# Patient Record
Sex: FEMALE | Race: WHITE | Employment: FULL TIME | URBAN - METROPOLITAN AREA
[De-identification: names, ages, dates, MRNs, and addresses within clinical notes are randomized per-mention and may not be internally consistent; named-entity substitution may affect disease eponyms.]

---

## 2017-03-02 ENCOUNTER — HOSPITAL ENCOUNTER (OUTPATIENT)
Dept: CARDIOLOGY | Facility: CLINIC | Age: 52
Discharge: HOME | End: 2017-03-02
Attending: FAMILY MEDICINE | Admitting: FAMILY MEDICINE
Payer: COMMERCIAL

## 2017-03-02 DIAGNOSIS — Z82.49 FAMILY HISTORY OF EARLY CAD: ICD-10-CM

## 2017-03-02 PROCEDURE — 75571 CT HRT W/O DYE W/CA TEST: CPT | Mod: 26 | Performed by: INTERNAL MEDICINE

## 2017-03-02 PROCEDURE — 75571 CT HRT W/O DYE W/CA TEST: CPT

## 2017-03-03 NOTE — PROGRESS NOTES
Calcium score results sent to patient and routed to PCP list in Epic, Kenneth Pallas. Radha Sotelo, CV Imaging Manager          .

## 2017-10-12 ENCOUNTER — HOSPITAL ENCOUNTER (OUTPATIENT)
Dept: MAMMOGRAPHY | Facility: CLINIC | Age: 52
Discharge: HOME | End: 2017-10-12
Attending: OBSTETRICS & GYNECOLOGY | Admitting: OBSTETRICS & GYNECOLOGY
Payer: COMMERCIAL

## 2017-10-12 DIAGNOSIS — Z12.31 VISIT FOR SCREENING MAMMOGRAM: ICD-10-CM

## 2017-10-12 PROCEDURE — G0202 SCR MAMMO BI INCL CAD: HCPCS

## 2017-10-12 PROCEDURE — 77063 BREAST TOMOSYNTHESIS BI: CPT

## 2018-07-12 ENCOUNTER — TRANSFERRED RECORDS (OUTPATIENT)
Dept: HEALTH INFORMATION MANAGEMENT | Facility: CLINIC | Age: 53
End: 2018-07-12

## 2018-08-09 ENCOUNTER — OFFICE VISIT (OUTPATIENT)
Dept: CARDIOLOGY | Facility: CLINIC | Age: 53
End: 2018-08-09
Payer: COMMERCIAL

## 2018-08-09 VITALS
SYSTOLIC BLOOD PRESSURE: 138 MMHG | HEIGHT: 62 IN | WEIGHT: 201 LBS | HEART RATE: 76 BPM | BODY MASS INDEX: 36.99 KG/M2 | DIASTOLIC BLOOD PRESSURE: 82 MMHG

## 2018-08-09 DIAGNOSIS — R00.0 TACHYCARDIA: Primary | ICD-10-CM

## 2018-08-09 DIAGNOSIS — R00.2 PALPITATIONS: ICD-10-CM

## 2018-08-09 PROCEDURE — 99204 OFFICE O/P NEW MOD 45 MIN: CPT | Performed by: INTERNAL MEDICINE

## 2018-08-09 NOTE — LETTER
8/9/2018    Kenneth G. Pallas, MD  [facility] [address]  [facility] [address]    RE: Yenni Ramires       Dear Colleague,    I had the pleasure of seeing Yenni Ramires in the [facility].    HPI and Plan:   See dictation    Orders Placed This Encounter   Procedures     Basic metabolic panel     TSH     Magnesium     Follow-Up with Cardiologist     Cardiac Event Monitor - Peds/Adult     Exercise Stress Echocardiogram     Echocardiogram       No orders of the defined types were placed in this encounter.      There are no discontinued medications.      Encounter Diagnoses   Name Primary?     Tachycardia Yes     Palpitations        CURRENT MEDICATIONS:  Current Outpatient Prescriptions   Medication Sig Dispense Refill     ALBUTEROL IN Inhale  into the lungs. prn       Cholecalciferol (VITAMIN D PO) Take  by mouth. occ and occ vitamin C       Multiple Vitamin (DAILY MULTIVITAMIN PO) Take  by mouth.         ALLERGIES     Allergies   Allergen Reactions     Penicillins      swelling and hives as an infant       PAST MEDICAL HISTORY:  Past Medical History:   Diagnosis Date     Anxiety      Asthma      Atherosclerosis      Fluctuating blood pressure      Hyperlipidemia      Obesity      Sinus tachycardia      Syncope, near        PAST SURGICAL HISTORY:  History reviewed. No pertinent surgical history.    FAMILY HISTORY:  Family History   Problem Relation Age of Onset     Myocardial Infarction Mother      Breast Cancer Mother      Diabetes Mother        SOCIAL HISTORY:  Social History     Social History     Marital status:      Spouse name: N/A     Number of children: N/A     Years of education: N/A     Social History Main Topics     Smoking status: Never Smoker     Smokeless tobacco: Never Used     Alcohol use Yes      Comment: occ     Drug use: None     Sexual activity: Yes     Partners: Male     Birth control/ protection: Surgical     Other Topics Concern      "None     Social History Narrative       Review of Systems:  Skin:  Negative       Eyes:  Positive for glasses    ENT:  Negative      Respiratory:  Positive for shortness of breath     Cardiovascular:  Negative for;chest pain lightheadedness;dizziness;Positive for;palpitations    Gastroenterology: Negative      Genitourinary:  not assessed      Musculoskeletal:  Positive for back pain    Neurologic:  Positive for headaches states has lots of headaches  Psychiatric:  Positive for sleep disturbances    Heme/Lymph/Imm:  Positive for   seasonal  Endocrine:  Negative        Physical Exam:  Vitals: /82  Pulse 76  Ht 1.575 m (5' 2\")  Wt 91.2 kg (201 lb)  BMI 36.76 kg/m2    Constitutional:  cooperative, alert and oriented, well developed, well nourished, in no acute distress obese      Skin:  warm and dry to the touch, no apparent skin lesions or masses noted          Head:  no masses or lesions        Eyes:  pupils equal and round        Lymph:No Cervical lymphadenopathy present     ENT:  no pallor or cyanosis        Neck:  carotid pulses are full and equal bilaterally, JVP normal, no carotid bruit        Respiratory:  normal breath sounds, clear to auscultation, normal A-P diameter, normal symmetry, normal respiratory excursion, no use of accessory muscles         Cardiac: regular rhythm, normal S1/S2, no S3 or S4, apical impulse not displaced, no murmurs, gallops or rubs   distant heart sounds            pulses full and equal                                        GI:  not assessed this visit        Extremities and Muscular Skeletal:  no deformities, clubbing, cyanosis, erythema observed;no edema              Neurological:  no gross motor deficits;affect appropriate        Psych:  Alert and Oriented x 3        CC  No referring provider defined for this encounter.                Thank you for allowing me to participate in the care of your patient.      Sincerely,     Watson Cleveland MD, MD "     [facility]    cc:   No referring provider defined for this encounter.

## 2018-08-09 NOTE — PROGRESS NOTES
HPI and Plan:   See dictation    Orders Placed This Encounter   Procedures     Basic metabolic panel     TSH     Magnesium     Follow-Up with Cardiologist     Cardiac Event Monitor - Peds/Adult     Exercise Stress Echocardiogram     Echocardiogram       No orders of the defined types were placed in this encounter.      There are no discontinued medications.      Encounter Diagnoses   Name Primary?     Tachycardia Yes     Palpitations        CURRENT MEDICATIONS:  Current Outpatient Prescriptions   Medication Sig Dispense Refill     ALBUTEROL IN Inhale  into the lungs. prn       Cholecalciferol (VITAMIN D PO) Take  by mouth. occ and occ vitamin C       Multiple Vitamin (DAILY MULTIVITAMIN PO) Take  by mouth.         ALLERGIES     Allergies   Allergen Reactions     Penicillins      swelling and hives as an infant       PAST MEDICAL HISTORY:  Past Medical History:   Diagnosis Date     Anxiety      Asthma      Atherosclerosis      Fluctuating blood pressure      Hyperlipidemia      Obesity      Sinus tachycardia      Syncope, near        PAST SURGICAL HISTORY:  History reviewed. No pertinent surgical history.    FAMILY HISTORY:  Family History   Problem Relation Age of Onset     Myocardial Infarction Mother      Breast Cancer Mother      Diabetes Mother        SOCIAL HISTORY:  Social History     Social History     Marital status:      Spouse name: N/A     Number of children: N/A     Years of education: N/A     Social History Main Topics     Smoking status: Never Smoker     Smokeless tobacco: Never Used     Alcohol use Yes      Comment: occ     Drug use: None     Sexual activity: Yes     Partners: Male     Birth control/ protection: Surgical     Other Topics Concern     None     Social History Narrative       Review of Systems:  Skin:  Negative       Eyes:  Positive for glasses    ENT:  Negative      Respiratory:  Positive for shortness of breath     Cardiovascular:  Negative for;chest pain  "lightheadedness;dizziness;Positive for;palpitations    Gastroenterology: Negative      Genitourinary:  not assessed      Musculoskeletal:  Positive for back pain    Neurologic:  Positive for headaches states has lots of headaches  Psychiatric:  Positive for sleep disturbances    Heme/Lymph/Imm:  Positive for   seasonal  Endocrine:  Negative        Physical Exam:  Vitals: /82  Pulse 76  Ht 1.575 m (5' 2\")  Wt 91.2 kg (201 lb)  BMI 36.76 kg/m2    Constitutional:  cooperative, alert and oriented, well developed, well nourished, in no acute distress obese      Skin:  warm and dry to the touch, no apparent skin lesions or masses noted          Head:  no masses or lesions        Eyes:  pupils equal and round        Lymph:No Cervical lymphadenopathy present     ENT:  no pallor or cyanosis        Neck:  carotid pulses are full and equal bilaterally, JVP normal, no carotid bruit        Respiratory:  normal breath sounds, clear to auscultation, normal A-P diameter, normal symmetry, normal respiratory excursion, no use of accessory muscles         Cardiac: regular rhythm, normal S1/S2, no S3 or S4, apical impulse not displaced, no murmurs, gallops or rubs   distant heart sounds            pulses full and equal                                        GI:  not assessed this visit        Extremities and Muscular Skeletal:  no deformities, clubbing, cyanosis, erythema observed;no edema              Neurological:  no gross motor deficits;affect appropriate        Psych:  Alert and Oriented x 3        CC  No referring provider defined for this encounter.              "

## 2018-08-09 NOTE — PROGRESS NOTES
"Service Date: 08/09/2018      INDICATION FOR CARDIAC CONSULTATION:  Tachycardia.      HISTORY OF PRESENT ILLNESS:  It was my pleasure to see your patient, Yenni Ramires, who is a very pleasant 52-year-old patient who has noticed over the last 2 weeks that she is getting headaches which extend from the back of her neck up to the top of her head and also she is noticing episodes of tachycardia on her Apple Watch when she has the headaches with heart rates going up to 110-115.  She sometimes notices that her heart is also beating strongly with the tachycardia.  She also has the sensation of \"foggy thinking.\"  This can be associated with the headache, but sometimes it is not associated with a headache and not necessarily associated with the tachycardia either.  She has not been complaining of irregular heartbeat.  She has had no symptoms of syncope or near-syncope.  However, if she does get up quickly, she feels somewhat dizzy.  She has noticed that her blood pressure is at the upper limits of normal at 138/82.        The patient only drinks decaffeinated coffee and very occasionally will drink a caffeinated pop.  She does not use Sudafed.  She occasionally uses Tylenol, so there is not a history of stimulant medications or compounds in this patient's case.      She did have a calcium score performed in 03/2017 which was normal.        She has noticed some discomfort in the upper chest.  This occurs very occasionally when she is under a lot of stress and lasts maybe for a few seconds.  This occurs very infrequently, maybe monthly.        Her 12-lead electrocardiogram was entirely normal.        She gets no exertional chest discomfort.  She has a possible diagnosis of asthma, but her pulmonary function tests were entirely normal and methacholine challenge was also normal.  If she smells something noxious, she starts to vomit and her breathing worsens.  She has been followed by Pulmonology with respect to this.    "   IMPRESSION:     1.  Tachycardia.  We need to determine whether this is a true arrhythmia or sinus tachycardia in the setting of headache.     2.  Chest discomfort.  This sounds somewhat atypical and it is reassuring that her calcium score last year was 0.  However, she does have some risk factors for coronary artery disease.  Her mother had a myocardial infarct.  The patient is overweight with a BMI of 36.8 and she appears to be probably borderline hypertensive.   3.  Obesity.   4.  Borderline hypertension.      PLAN:  We will obtain an event monitor to determine the nature of her palpitations.  We will obtain an echocardiogram to determine if there is any evidence of structural heart disease which might be underlying the tachycardia and we will also obtain a stress echocardiogram to ensure that ischemia is not present.  Finally, we will obtain a basic metabolic profile, TSH and basic metabolic profile and magnesium today.        We will have the patient follow up with the results of these tests in about 4 week's time at [place].  I look forward to seeing her again.  She has been told to contact us if she has any questions or any concerns.        cc:      Kenneth Pallas, MD    [facility]   [address] [place] [address]         ANALIA DUNHAM MD, [facility]             D: 2018   T: 2018   MT: LILLIE      Name:     CHRISTOPHER HERNANDEZ   MRN:      [id number]        Account:      [id number]   :      1965           Service Date: 2018      Document: [id number]

## 2018-08-09 NOTE — MR AVS SNAPSHOT
After Visit Summary   8/9/2018    Yenni Ramires    MRN: [id number]           Patient Information     Date Of Birth          1965        Visit Information        Provider Department      8/9/2018 8:45 AM Watson Cleveland MD [facility]   [place]        Today's Diagnoses     Tachycardia    -  1    Palpitations           Follow-ups after your visit        Additional Services     Follow-Up with Cardiologist                 Your next 10 appointments already scheduled     Sep 06, 2018  9:00 AM CDT   Ech Stress Test with RHSTRESS   [facility] ([facility])    [address]  [facility] [address]   [phone]           1. Please bring or wear a comfortable two-piece outfit and walking shoes. 2. Stop eating 3 hours before the test. You may drink water or juice. 3. Stop all caffeine 12 hours before the test. This includes coffee, tea, soda pop, chocolate and certain medicines (such as Anacin and Excederin). Also avoid decaf coffee and tea, as these contain small amounts of caffeine. 4. No alcohol, smoking or use of other tobacco products for 12 hours before the test. 5. Refer to your provider instructions to see if you need to stop any medications (such as beta-blockers or nitrates) for this test. 6. For patients with diabetes: - If you take insulin, call your diabetes care team. Ask if you should take a   dose the morning of your test. - If you take diabetes medicine by mouth, dont take it on the morning of your test. Bring it with you to take after the test. (If you have questions, call your diabetes care team) 7. When you arrive, please tell us if: - You have diabetes. - You have taken Viagra, Cialis or Levitra in the past 48 hours. 8. For any questions that cannot be answered, please contact the ordering physician 9. Please do not wear perfumes or scented lotions on the day of your exam.            Oct 23, 2018 10:45  "AM CDT   Return Visit with Watson Cleveland MD   [facility] ([facility])    [address]  [facility] [address] [phone]              Future tests that were ordered for you today     Open Future Orders        Priority Expected Expires Ordered    Follow-Up with Cardiologist Routine 9/8/2018 8/9/2019 8/9/2018    Magnesium Routine 8/9/2018 8/9/2019 8/9/2018    Basic metabolic panel Routine 8/9/2018 8/9/2019 8/9/2018    TSH Routine 8/9/2018 8/9/2019 8/9/2018    Cardiac Event Monitor - Peds/Adult Routine 8/10/2018 8/9/2019 8/9/2018    Exercise Stress Echocardiogram Routine 8/16/2018 8/9/2019 8/9/2018    Echocardiogram Routine 8/16/2018 8/9/2019 8/9/2018            Who to contact     If you have questions or need follow up information about today's clinic visit or your schedule please contact [facility]   NARESH directly at [phone].  Normal or non-critical lab and imaging results will be communicated to you by MyChart, letter or phone within 4 business days after the clinic has received the results. If you do not hear from us within 7 days, please contact the clinic through MyChart or phone. If you have a critical or abnormal lab result, we will notify you by phone as soon as possible.  Submit refill requests through [employer] or call your pharmacy and they will forward the refill request to us. Please allow 3 business days for your refill to be completed.          Additional Information About Your Visit        Care EveryWhere ID     This is your Care EveryWhere ID. This could be used by other organizations to access your [place] medical records  [id number]        Your Vitals Were     Pulse Height BMI (Body Mass Index)             76 1.575 m (5' 2\") 36.76 kg/m2          Blood Pressure from Last 3 Encounters:   08/09/18 138/82   07/10/12 120/80    Weight from Last 3 Encounters:   08/09/18 91.2 kg (201 " lb)               Primary Care Provider Office Phone # Fax #    Kenneth G Pallas, [phone]       [facility] [id number]  [facility] [address]        Equal Access to Services     JOHN ASHBY : Hadii aad ku hadyeeo Soomaali, waaxda luqadaha, qaybta kaalmada adeegyada, jacqueline brightn shamar díaz laColetoya esparza. So [facility] [phone].    ATENCIÓN: Si habla español, tiene a jarrett disposición servicios gratuitos de asistencia lingüística. Llame al [phone].    We comply with applicable federal civil rights laws and [place] laws. We do not discriminate on the basis of race, color, national origin, age, disability, sex, sexual orientation, or gender identity.            Thank you!     Thank you for choosing [facility]  for your care. Our goal is always to provide you with excellent care. Hearing back from our patients is one way we can continue to improve our services. Please take a few minutes to complete the written survey that you may receive in the mail after your visit with us. Thank you!             Your Updated Medication List - Protect others around you: Learn how to safely use, store and throw away your medicines at www.disposemymeds.org.          This list is accurate as of 8/9/18  9:11 AM.  Always use your most recent med list.                   Brand Name Dispense Instructions for use Diagnosis    ALBUTEROL IN      Inhale  into the lungs. prn        DAILY MULTIVITAMIN PO      Take  by mouth.        VITAMIN D PO      Take  by mouth. occ and occ vitamin C

## 2018-09-06 ENCOUNTER — HOSPITAL ENCOUNTER (OUTPATIENT)
Dept: CARDIOLOGY | Facility: CLINIC | Age: 53
Discharge: HOME | End: 2018-09-06
Attending: INTERNAL MEDICINE | Admitting: INTERNAL MEDICINE
Payer: COMMERCIAL

## 2018-09-06 DIAGNOSIS — R00.0 TACHYCARDIA: ICD-10-CM

## 2018-09-06 DIAGNOSIS — R00.2 PALPITATIONS: ICD-10-CM

## 2018-09-06 PROCEDURE — 93018 CV STRESS TEST I&R ONLY: CPT | Performed by: INTERNAL MEDICINE

## 2018-09-06 PROCEDURE — 25500064 ZZH RX 255 OP 636: Performed by: INTERNAL MEDICINE

## 2018-09-06 PROCEDURE — 93016 CV STRESS TEST SUPVJ ONLY: CPT | Performed by: INTERNAL MEDICINE

## 2018-09-06 PROCEDURE — 93350 STRESS TTE ONLY: CPT | Mod: 26 | Performed by: INTERNAL MEDICINE

## 2018-09-06 PROCEDURE — 93325 DOPPLER ECHO COLOR FLOW MAPG: CPT | Mod: TC

## 2018-09-06 PROCEDURE — 93325 DOPPLER ECHO COLOR FLOW MAPG: CPT | Mod: 26 | Performed by: INTERNAL MEDICINE

## 2018-09-06 PROCEDURE — 93321 DOPPLER ECHO F-UP/LMTD STD: CPT | Mod: 26 | Performed by: INTERNAL MEDICINE

## 2018-09-06 RX ADMIN — HUMAN ALBUMIN MICROSPHERES AND PERFLUTREN 3 ML: 10; .22 INJECTION, SOLUTION INTRAVENOUS at 09:45

## 2018-09-11 ENCOUNTER — TELEPHONE (OUTPATIENT)
Dept: CARDIOLOGY | Facility: CLINIC | Age: 53
End: 2018-09-11

## 2018-09-11 ENCOUNTER — HOSPITAL ENCOUNTER (OUTPATIENT)
Dept: CARDIOLOGY | Facility: CLINIC | Age: 53
End: 2018-09-11
Attending: INTERNAL MEDICINE
Payer: COMMERCIAL

## 2018-09-11 ENCOUNTER — HOSPITAL ENCOUNTER (OUTPATIENT)
Dept: CARDIOLOGY | Facility: CLINIC | Age: 53
Discharge: HOME | End: 2018-09-11
Attending: INTERNAL MEDICINE | Admitting: INTERNAL MEDICINE
Payer: COMMERCIAL

## 2018-09-11 DIAGNOSIS — R00.2 PALPITATIONS: ICD-10-CM

## 2018-09-11 DIAGNOSIS — R00.0 TACHYCARDIA: ICD-10-CM

## 2018-09-11 DIAGNOSIS — R07.9 CHEST PAIN: Primary | ICD-10-CM

## 2018-09-11 PROCEDURE — 93272 ECG/REVIEW INTERPRET ONLY: CPT | Performed by: INTERNAL MEDICINE

## 2018-09-11 PROCEDURE — 93306 TTE W/DOPPLER COMPLETE: CPT

## 2018-09-11 PROCEDURE — 93306 TTE W/DOPPLER COMPLETE: CPT | Mod: 26 | Performed by: INTERNAL MEDICINE

## 2018-09-11 PROCEDURE — 93270 REMOTE 30 DAY ECG REV/REPORT: CPT

## 2018-09-11 NOTE — TELEPHONE ENCOUNTER
Reviewed echo showing   Left ventricular systolic function is normal.  The visual ejection fraction is estimated at 55-60%. (Estimated at 55% by arias's biplane method.).  Left ventricular diastolic function is normal.  The right ventricle is normal in structure, function and size.  No hemodynamically significant valvular abnormalities.  Sinus rhythm was noted.  There is no comparison study available.    Reviewed stress echo showing   Technically difficult study despite use of contrast.  LV cavity size decreases with appropriate augmentation of LV systolic function post stress.   No distinct wall motion abnormalities seen.  Pt had 6/10 chest discomfort but no significant EKG changes noted.  Overall probably normal study    Pt has got event monitor today, has office visit w/ Dr. Cleveland 10/23/18.  Will message Dr. Cleveland to review. LPenfield RN

## 2018-09-12 NOTE — TELEPHONE ENCOUNTER
Would obtain a CT angio as she had CP with exercise stress echo even though EKG and echo normal though technically difficult study. Owen

## 2018-09-13 DIAGNOSIS — R00.0 TACHYCARDIA: ICD-10-CM

## 2018-09-13 DIAGNOSIS — R00.2 PALPITATIONS: ICD-10-CM

## 2018-09-13 LAB
ANION GAP SERPL CALCULATED.3IONS-SCNC: 6 MMOL/L (ref 3–14)
BUN SERPL-MCNC: 11 MG/DL (ref 7–30)
CALCIUM SERPL-MCNC: 8.3 MG/DL (ref 8.5–10.1)
CHLORIDE SERPL-SCNC: 105 MMOL/L (ref 94–109)
CO2 SERPL-SCNC: 26 MMOL/L (ref 20–32)
CREAT SERPL-MCNC: 0.7 MG/DL (ref 0.52–1.04)
GFR SERPL CREATININE-BSD FRML MDRD: 88 ML/MIN/1.7M2
GLUCOSE SERPL-MCNC: 92 MG/DL (ref 70–99)
MAGNESIUM SERPL-MCNC: 2.1 MG/DL (ref 1.6–2.3)
POTASSIUM SERPL-SCNC: 4.5 MMOL/L (ref 3.4–5.3)
SODIUM SERPL-SCNC: 137 MMOL/L (ref 133–144)
TSH SERPL DL<=0.005 MIU/L-ACNC: 1.44 MU/L (ref 0.4–4)

## 2018-09-13 PROCEDURE — 84443 ASSAY THYROID STIM HORMONE: CPT | Performed by: INTERNAL MEDICINE

## 2018-09-13 PROCEDURE — 83735 ASSAY OF MAGNESIUM: CPT | Performed by: INTERNAL MEDICINE

## 2018-09-13 PROCEDURE — 80048 BASIC METABOLIC PNL TOTAL CA: CPT | Performed by: INTERNAL MEDICINE

## 2018-09-13 PROCEDURE — 36415 COLL VENOUS BLD VENIPUNCTURE: CPT | Performed by: INTERNAL MEDICINE

## 2018-09-13 NOTE — TELEPHONE ENCOUNTER
Attempted to call pt with lab results & recommendations from Dr. Cleveland, left message for pt to call back. LPenfield RN

## 2018-09-13 NOTE — TELEPHONE ENCOUNTER
Pt called back, informed of recommendations from Dr. Cleveland. Order in chart & pt transferred to scheduling to arrange. LPenfield RN

## 2018-09-14 ENCOUNTER — DOCUMENTATION ONLY (OUTPATIENT)
Dept: CARDIOLOGY | Facility: CLINIC | Age: 53
End: 2018-09-14

## 2018-09-14 NOTE — PROGRESS NOTES
Reviewed [employer] report dated 9/11/18 showing SR w/ one PVC at 78-85 bpm. Report to file. LPenfield RN

## 2018-10-02 ENCOUNTER — HOSPITAL ENCOUNTER (OUTPATIENT)
Dept: CARDIOLOGY | Facility: CLINIC | Age: 53
Discharge: HOME | End: 2018-10-02
Admitting: INTERNAL MEDICINE
Payer: COMMERCIAL

## 2018-10-02 VITALS — DIASTOLIC BLOOD PRESSURE: 75 MMHG | HEART RATE: 65 BPM | SYSTOLIC BLOOD PRESSURE: 130 MMHG

## 2018-10-02 DIAGNOSIS — R07.9 CHEST PAIN: ICD-10-CM

## 2018-10-02 PROCEDURE — 25000125 ZZHC RX 250: Performed by: INTERNAL MEDICINE

## 2018-10-02 PROCEDURE — 75574 CT ANGIO HRT W/3D IMAGE: CPT

## 2018-10-02 PROCEDURE — 25000128 H RX IP 250 OP 636: Performed by: INTERNAL MEDICINE

## 2018-10-02 PROCEDURE — 25000132 ZZH RX MED GY IP 250 OP 250 PS 637: Performed by: INTERNAL MEDICINE

## 2018-10-02 PROCEDURE — 75574 CT ANGIO HRT W/3D IMAGE: CPT | Mod: 26 | Performed by: INTERNAL MEDICINE

## 2018-10-02 RX ORDER — IOPAMIDOL 755 MG/ML
500 INJECTION, SOLUTION INTRAVASCULAR ONCE
Status: COMPLETED | OUTPATIENT
Start: 2018-10-02 | End: 2018-10-02

## 2018-10-02 RX ORDER — NITROGLYCERIN 0.4 MG/1
0.4 TABLET SUBLINGUAL
Status: DISCONTINUED | OUTPATIENT
Start: 2018-10-02 | End: 2018-10-03 | Stop reason: HOSPADM

## 2018-10-02 RX ORDER — METOPROLOL TARTRATE 50 MG
50-100 TABLET ORAL
Status: COMPLETED | OUTPATIENT
Start: 2018-10-02 | End: 2018-10-02

## 2018-10-02 RX ORDER — ACYCLOVIR 200 MG/1
0-1 CAPSULE ORAL
Status: DISCONTINUED | OUTPATIENT
Start: 2018-10-02 | End: 2018-10-03 | Stop reason: HOSPADM

## 2018-10-02 RX ORDER — METOPROLOL TARTRATE 1 MG/ML
5-15 INJECTION, SOLUTION INTRAVENOUS
Status: DISCONTINUED | OUTPATIENT
Start: 2018-10-02 | End: 2018-10-03 | Stop reason: HOSPADM

## 2018-10-02 RX ORDER — METHYLPREDNISOLONE SODIUM SUCCINATE 125 MG/2ML
125 INJECTION, POWDER, LYOPHILIZED, FOR SOLUTION INTRAMUSCULAR; INTRAVENOUS
Status: DISCONTINUED | OUTPATIENT
Start: 2018-10-02 | End: 2018-10-03 | Stop reason: HOSPADM

## 2018-10-02 RX ORDER — DIPHENHYDRAMINE HYDROCHLORIDE 50 MG/ML
25-50 INJECTION INTRAMUSCULAR; INTRAVENOUS
Status: DISCONTINUED | OUTPATIENT
Start: 2018-10-02 | End: 2018-10-03 | Stop reason: HOSPADM

## 2018-10-02 RX ORDER — DIPHENHYDRAMINE HCL 25 MG
25 CAPSULE ORAL
Status: DISCONTINUED | OUTPATIENT
Start: 2018-10-02 | End: 2018-10-03 | Stop reason: HOSPADM

## 2018-10-02 RX ORDER — ONDANSETRON 2 MG/ML
4 INJECTION INTRAMUSCULAR; INTRAVENOUS
Status: DISCONTINUED | OUTPATIENT
Start: 2018-10-02 | End: 2018-10-03 | Stop reason: HOSPADM

## 2018-10-02 RX ADMIN — IOPAMIDOL 120 ML: 755 INJECTION, SOLUTION INTRAVENOUS at 09:52

## 2018-10-02 RX ADMIN — METOPROLOL TARTRATE 10 MG: 5 INJECTION, SOLUTION INTRAVENOUS at 09:44

## 2018-10-02 RX ADMIN — METOPROLOL TARTRATE 10 MG: 5 INJECTION, SOLUTION INTRAVENOUS at 09:28

## 2018-10-02 RX ADMIN — METOPROLOL TARTRATE 10 MG: 5 INJECTION, SOLUTION INTRAVENOUS at 09:33

## 2018-10-02 RX ADMIN — SODIUM CHLORIDE 100 ML: 9 INJECTION, SOLUTION INTRAVENOUS at 09:52

## 2018-10-02 RX ADMIN — METOPROLOL TARTRATE 10 MG: 5 INJECTION, SOLUTION INTRAVENOUS at 09:39

## 2018-10-02 RX ADMIN — NITROGLYCERIN 0.4 MG: 0.4 TABLET SUBLINGUAL at 09:35

## 2018-10-02 RX ADMIN — METOPROLOL TARTRATE 100 MG: 50 TABLET ORAL at 08:08

## 2018-10-23 ENCOUNTER — OFFICE VISIT (OUTPATIENT)
Dept: CARDIOLOGY | Facility: CLINIC | Age: 53
End: 2018-10-23
Attending: INTERNAL MEDICINE
Payer: COMMERCIAL

## 2018-10-23 VITALS
HEART RATE: 92 BPM | HEIGHT: 62 IN | BODY MASS INDEX: 37.15 KG/M2 | SYSTOLIC BLOOD PRESSURE: 134 MMHG | WEIGHT: 201.9 LBS | DIASTOLIC BLOOD PRESSURE: 88 MMHG

## 2018-10-23 DIAGNOSIS — R00.2 PALPITATIONS: ICD-10-CM

## 2018-10-23 DIAGNOSIS — R00.0 TACHYCARDIA: ICD-10-CM

## 2018-10-23 PROCEDURE — 99214 OFFICE O/P EST MOD 30 MIN: CPT | Performed by: INTERNAL MEDICINE

## 2018-10-23 PROCEDURE — 93000 ELECTROCARDIOGRAM COMPLETE: CPT | Performed by: INTERNAL MEDICINE

## 2018-10-23 NOTE — MR AVS SNAPSHOT
"              After Visit Summary   10/23/2018    Yenni Ramires    MRN: [id number]           Patient Information     Date Of Birth          1965        Visit Information        Provider Department      10/23/2018 10:45 AM Watson Cleveland MD [facility]        Today's Diagnoses     Tachycardia        Palpitations           Follow-ups after your visit        Who to contact     If you have questions or need follow up information about today's clinic visit or your schedule please contact [facility] directly at [phone].  Normal or non-critical lab and imaging results will be communicated to you by MyChart, letter or phone within 4 business days after the clinic has received the results. If you do not hear from us within 7 days, please contact the clinic through MyChart or phone. If you have a critical or abnormal lab result, we will notify you by phone as soon as possible.  Submit refill requests through [employer] or call your pharmacy and they will forward the refill request to us. Please allow 3 business days for your refill to be completed.          Additional Information About Your Visit        Care EveryWhere ID     This is your Care EveryWhere ID. This could be used by other organizations to access your [place] medical records  [id number]        Your Vitals Were     Pulse Height Breastfeeding? BMI (Body Mass Index)          92 1.575 m (5' 2\") No 36.93 kg/m2         Blood Pressure from Last 3 Encounters:   10/23/18 134/88   10/02/18 130/75   08/09/18 138/82    Weight from Last 3 Encounters:   10/23/18 91.6 kg (201 lb 14.4 oz)   08/09/18 91.2 kg (201 lb)              We Performed the Following     EKG 12-lead complete w/read - Clinics     Follow-Up with Cardiologist        Primary Care Provider Office Phone # Fax #    Kenneth G Pallas, [phone]       [facility] [id number] [address]  [facility] [address]        Equal Access to Services     MARCOSASHISH SYMONE : Hadii aad ku hadyeenico Zimmer, kelvin kee, joseykayleigh azevedozahraestelle garcia, jacqueline promattburke esparza. So [facility] [phone].    ATENCIÓN: Si habla español, tiene a jarrett disposición servicios gratuitos de asistencia lingüística. Llame al [phone].    We comply with applicable federal civil rights laws and [place] laws. We do not discriminate on the basis of race, color, national origin, age, disability, sex, sexual orientation, or gender identity.            Thank you!     Thank you for choosing [facility]  for your care. Our goal is always to provide you with excellent care. Hearing back from our patients is one way we can continue to improve our services. Please take a few minutes to complete the written survey that you may receive in the mail after your visit with us. Thank you!             Your Updated Medication List - Protect others around you: Learn how to safely use, store and throw away your medicines at www.disposemymeds.org.          This list is accurate as of 10/23/18 11:02 AM.  Always use your most recent med list.                   Brand Name Dispense Instructions for use Diagnosis    ALBUTEROL IN      Inhale  into the lungs. prn        DAILY MULTIVITAMIN PO      Take  by mouth.        VITAMIN D PO      Take  by mouth. occ and occ vitamin C

## 2018-10-23 NOTE — PROGRESS NOTES
Service Date: 10/23/2018      CARDIOLOGY FOLLOWUP VISIT      REFERRING PHYSICIAN:  Dr. Kenneth Pallas      HISTORY OF PRESENT ILLNESS:  It was my pleasure to see your patient, Yenni Ramires, in followup.  If you remember, this is a patient who was complaining of some episodes of tachycardia and a feeling of foggy thinking when she has the tachycardia.  She also noticed some pressure in her chest as well.  As part of a workup, she had an event monitor performed.  Event monitor was very unimpressive.  She has had 3 episodes of where she triggered the monitor.  Two of the episodes showed sinus rhythm. The third episode showed a single PVC.  So, a very benign event monitor with no arrhythmias noted, apart from the single PVC.  She then had a stress echocardiogram performed and the stress echo from the point of view of the EKG and the echo images was normal.  However, she had 6/10 chest discomfort during the stress test.  There were no associated EKG changes with the chest discomfort and no wall motion abnormalities.  The patient then had a CT angiogram performed because of the chest discomfort.  Firstly, her calcium score was zero, indicating no calcified plaque.  This also indicates a low incidence of cardiac events.  The LAD and the circumflex were widely patent with no evidence of obstructive coronary artery disease.  There was a small portion of the vessel in the mid-right coronary artery which could not be seen but the remainder of the vessel showed no plaque whatsoever and the reader overall suspected that the vessel was patent.  So, it does appear that this patient, taking all of the information together, does not have angina pectoris and does not have obstructive coronary artery disease based upon a normal stress EKG, normal stress echocardiogram and probably normal CT angiogram with a zero calcium score.  So, the patient today was complaining of feeling of tachycardia, based on her Apple watch.  We performed an  EKG during the symptoms and the EKG showed sinus rhythm, upper limits of normal at 96 beats per minute with no ischemic changes and no arrhythmia.  This was good feedback for the patient to show her that the symptoms that she is actually having are not arrhythmia and not ischemia.      IMPRESSION:     1.  As described above, the patient does not have evidence of significant arrhythmia apart from a single PVC during the period of monitoring and her symptoms did not correlate with supraventricular tachycardia or ventricular arrhythmia or ventricular tachycardia.     2.  Normal stress echo and stress EKG but did have some chest discomfort with exertion for which she had a CT scan which had no significant evidence of coronary artery disease with a negative calcium score.        PLAN:  At this stage, I have advised the patient to continue to exercise.  I have also asked her to discuss with you anxiety-relieving strategies.  As always, the patient can contact us if necessary.  It has been my pleasure to be involved in the care of this very nice patient.     This was a 30 minute visit of which all was counseling and education.     Watson Mendoza MD, FACC       cc:   Kenneth Pallas, MD    [facility]    [address] [place] [address]         WATSON MENDOZA MD, FACC             D: 10/23/2018   T: 10/23/2018   MT: CHANELLE      Name:     CHRISTOPHER HERNANDEZ   MRN:      [id number]        Account:      [id number]   :      1965           Service Date: 10/23/2018      Document: [id number]

## 2018-10-23 NOTE — LETTER
10/23/2018    Kenneth G. Pallas, MD  [facility] [address]  [facility] [address]    RE: Yenni Ramires       Dear Colleague,    I had the pleasure of seeing Yenni Ramires in the [facility].    HPI and Plan:   See dictation    Orders Placed This Encounter   Procedures     EKG 12-lead complete w/read - Clinics       No orders of the defined types were placed in this encounter.      There are no discontinued medications.      Encounter Diagnoses   Name Primary?     Tachycardia      Palpitations        CURRENT MEDICATIONS:  Current Outpatient Prescriptions   Medication Sig Dispense Refill     Cholecalciferol (VITAMIN D PO) Take  by mouth. occ and occ vitamin C       Multiple Vitamin (DAILY MULTIVITAMIN PO) Take  by mouth.       ALBUTEROL IN Inhale  into the lungs. prn         ALLERGIES     Allergies   Allergen Reactions     Penicillins      swelling and hives as an infant       PAST MEDICAL HISTORY:  Past Medical History:   Diagnosis Date     Anxiety      Asthma      Atherosclerosis      Fluctuating blood pressure      Hyperlipidemia      Obesity      Sinus tachycardia      Syncope, near        PAST SURGICAL HISTORY:  History reviewed. No pertinent surgical history.    FAMILY HISTORY:  Family History   Problem Relation Age of Onset     Myocardial Infarction Mother      Breast Cancer Mother      Diabetes Mother        SOCIAL HISTORY:  Social History     Social History     Marital status:      Spouse name: N/A     Number of children: N/A     Years of education: N/A     Social History Main Topics     Smoking status: Never Smoker     Smokeless tobacco: Never Used     Alcohol use Yes      Comment: occ     Drug use: No     Sexual activity: Yes     Partners: Male     Birth control/ protection: Surgical     Other Topics Concern     None     Social History Narrative       Review of Systems:  Skin:  Negative       Eyes:  Positive for glasses    ENT:  Negative     "  Respiratory:  Positive for dyspnea on exertion     Cardiovascular:    fatigue;Positive for    Gastroenterology: Positive for heartburn    Genitourinary:         Musculoskeletal:  Positive for joint pain    Neurologic:  Positive for headaches    Psychiatric:  Positive for sleep disturbances    Heme/Lymph/Imm:  Negative      Endocrine:  Negative        Physical Exam:  Vitals: /88 (BP Location: Right arm, Patient Position: Sitting, Cuff Size: Adult Large)  Pulse 92  Ht 1.575 m (5' 2\")  Wt 91.6 kg (201 lb 14.4 oz)  Breastfeeding? No  BMI 36.93 kg/m2    Constitutional:  cooperative, alert and oriented, well developed, well nourished, in no acute distress obese      Skin:  warm and dry to the touch, no apparent skin lesions or masses noted          Head:  no masses or lesions        Eyes:  pupils equal and round        Lymph:No Cervical lymphadenopathy present     ENT:  no pallor or cyanosis        Neck:  carotid pulses are full and equal bilaterally, JVP normal, no carotid bruit        Respiratory:  normal breath sounds, clear to auscultation, normal A-P diameter, normal symmetry, normal respiratory excursion, no use of accessory muscles         Cardiac: regular rhythm, normal S1/S2, no S3 or S4, apical impulse not displaced, no murmurs, gallops or rubs   distant heart sounds            pulses full and equal                                        GI:  not assessed this visit        Extremities and Muscular Skeletal:  no deformities, clubbing, cyanosis, erythema observed;no edema              Neurological:  no gross motor deficits;affect appropriate        Psych:  Alert and Oriented x 3        CC  Watson Cleveland MD  [address]  [place] [address]                Thank you for allowing me to participate in the care of your patient.      Sincerely,     Watson Cleveland MD, MD     [facility]    cc:   Watson Cleveland MD  [address]  [place] [id number]

## 2018-10-23 NOTE — PROGRESS NOTES
HPI and Plan:   See dictation    Orders Placed This Encounter   Procedures     EKG 12-lead complete w/read - Clinics       No orders of the defined types were placed in this encounter.      There are no discontinued medications.      Encounter Diagnoses   Name Primary?     Tachycardia      Palpitations        CURRENT MEDICATIONS:  Current Outpatient Prescriptions   Medication Sig Dispense Refill     Cholecalciferol (VITAMIN D PO) Take  by mouth. occ and occ vitamin C       Multiple Vitamin (DAILY MULTIVITAMIN PO) Take  by mouth.       ALBUTEROL IN Inhale  into the lungs. prn         ALLERGIES     Allergies   Allergen Reactions     Penicillins      swelling and hives as an infant       PAST MEDICAL HISTORY:  Past Medical History:   Diagnosis Date     Anxiety      Asthma      Atherosclerosis      Fluctuating blood pressure      Hyperlipidemia      Obesity      Sinus tachycardia      Syncope, near        PAST SURGICAL HISTORY:  History reviewed. No pertinent surgical history.    FAMILY HISTORY:  Family History   Problem Relation Age of Onset     Myocardial Infarction Mother      Breast Cancer Mother      Diabetes Mother        SOCIAL HISTORY:  Social History     Social History     Marital status:      Spouse name: N/A     Number of children: N/A     Years of education: N/A     Social History Main Topics     Smoking status: Never Smoker     Smokeless tobacco: Never Used     Alcohol use Yes      Comment: occ     Drug use: No     Sexual activity: Yes     Partners: Male     Birth control/ protection: Surgical     Other Topics Concern     None     Social History Narrative       Review of Systems:  Skin:  Negative       Eyes:  Positive for glasses    ENT:  Negative      Respiratory:  Positive for dyspnea on exertion     Cardiovascular:    fatigue;Positive for    Gastroenterology: Positive for heartburn    Genitourinary:         Musculoskeletal:  Positive for joint pain    Neurologic:  Positive for headaches   "  Psychiatric:  Positive for sleep disturbances    Heme/Lymph/Imm:  Negative      Endocrine:  Negative        Physical Exam:  Vitals: /88 (BP Location: Right arm, Patient Position: Sitting, Cuff Size: Adult Large)  Pulse 92  Ht 1.575 m (5' 2\")  Wt 91.6 kg (201 lb 14.4 oz)  Breastfeeding? No  BMI 36.93 kg/m2    Constitutional:  cooperative, alert and oriented, well developed, well nourished, in no acute distress obese      Skin:  warm and dry to the touch, no apparent skin lesions or masses noted          Head:  no masses or lesions        Eyes:  pupils equal and round        Lymph:No Cervical lymphadenopathy present     ENT:  no pallor or cyanosis        Neck:  carotid pulses are full and equal bilaterally, JVP normal, no carotid bruit        Respiratory:  normal breath sounds, clear to auscultation, normal A-P diameter, normal symmetry, normal respiratory excursion, no use of accessory muscles         Cardiac: regular rhythm, normal S1/S2, no S3 or S4, apical impulse not displaced, no murmurs, gallops or rubs   distant heart sounds            pulses full and equal                                        GI:  not assessed this visit        Extremities and Muscular Skeletal:  no deformities, clubbing, cyanosis, erythema observed;no edema              Neurological:  no gross motor deficits;affect appropriate        Psych:  Alert and Oriented x 3        CC  Watson Cleveland MD  [address]  [place] [id number]              "

## 2018-10-25 ENCOUNTER — CARE COORDINATION (OUTPATIENT)
Dept: CARDIOLOGY | Facility: CLINIC | Age: 53
End: 2018-10-25

## 2018-10-25 NOTE — PROGRESS NOTES
Received call from pt asking if her fast heart rate can cause her heart to enlarge. Pt advised that her heart rate has not been documented as >100 bpm. Discussed what normal heart rate is and with sinus tachycardia the treatment is to find & treat the underlying cause rather than just starting beta blockers. Office note mailed to pt as requested. LPenfield RN

## 2019-01-17 ENCOUNTER — HOSPITAL ENCOUNTER (OUTPATIENT)
Dept: MAMMOGRAPHY | Facility: CLINIC | Age: 54
Discharge: HOME | End: 2019-01-17
Attending: FAMILY MEDICINE | Admitting: FAMILY MEDICINE
Payer: COMMERCIAL

## 2019-01-17 DIAGNOSIS — Z12.31 VISIT FOR SCREENING MAMMOGRAM: ICD-10-CM

## 2019-01-17 PROCEDURE — 77063 BREAST TOMOSYNTHESIS BI: CPT

## 2021-09-02 ENCOUNTER — HOSPITAL ENCOUNTER (OUTPATIENT)
Dept: MAMMOGRAPHY | Facility: CLINIC | Age: 56
Discharge: HOME | End: 2021-09-02
Attending: OBSTETRICS & GYNECOLOGY | Admitting: OBSTETRICS & GYNECOLOGY
Payer: COMMERCIAL

## 2021-09-02 DIAGNOSIS — Z12.31 VISIT FOR SCREENING MAMMOGRAM: ICD-10-CM

## 2021-09-02 PROCEDURE — 77063 BREAST TOMOSYNTHESIS BI: CPT

## 2023-06-02 ENCOUNTER — HOSPITAL ENCOUNTER (OUTPATIENT)
Dept: MAMMOGRAPHY | Facility: CLINIC | Age: 58
Discharge: HOME | End: 2023-06-02
Attending: PHYSICIAN ASSISTANT | Admitting: PHYSICIAN ASSISTANT
Payer: COMMERCIAL

## 2023-06-02 DIAGNOSIS — Z12.31 ENCOUNTER FOR SCREENING MAMMOGRAM FOR MALIGNANT NEOPLASM OF BREAST: ICD-10-CM

## 2023-06-02 PROCEDURE — 77067 SCR MAMMO BI INCL CAD: CPT

## (undated) RX ORDER — NITROGLYCERIN 0.4 MG/1
TABLET SUBLINGUAL
Status: DISPENSED
Start: 2018-10-02

## (undated) RX ORDER — METOPROLOL TARTRATE 50 MG
TABLET ORAL
Status: DISPENSED
Start: 2018-10-02

## (undated) RX ORDER — METOPROLOL TARTRATE 1 MG/ML
INJECTION, SOLUTION INTRAVENOUS
Status: DISPENSED
Start: 2018-10-02